# Patient Record
Sex: MALE | Race: WHITE
[De-identification: names, ages, dates, MRNs, and addresses within clinical notes are randomized per-mention and may not be internally consistent; named-entity substitution may affect disease eponyms.]

---

## 2019-03-11 ENCOUNTER — HOSPITAL ENCOUNTER (EMERGENCY)
Dept: HOSPITAL 10 - FTE | Age: 29
Discharge: HOME | End: 2019-03-11
Payer: COMMERCIAL

## 2019-03-11 ENCOUNTER — HOSPITAL ENCOUNTER (EMERGENCY)
Dept: HOSPITAL 91 - FTE | Age: 29
Discharge: HOME | End: 2019-03-11
Payer: COMMERCIAL

## 2019-03-11 VITALS
WEIGHT: 200.18 LBS | HEIGHT: 72 IN | BODY MASS INDEX: 27.11 KG/M2 | WEIGHT: 200.18 LBS | BODY MASS INDEX: 27.11 KG/M2 | HEIGHT: 72 IN

## 2019-03-11 VITALS — SYSTOLIC BLOOD PRESSURE: 109 MMHG | DIASTOLIC BLOOD PRESSURE: 58 MMHG | RESPIRATION RATE: 18 BRPM | HEART RATE: 74 BPM

## 2019-03-11 DIAGNOSIS — R11.2: ICD-10-CM

## 2019-03-11 DIAGNOSIS — R10.9: Primary | ICD-10-CM

## 2019-03-11 LAB
ADD MAN DIFF?: NO
ADD UMIC: YES
ALANINE AMINOTRANSFERASE: 82 IU/L (ref 13–69)
ALBUMIN/GLOBULIN RATIO: 1.58
ALBUMIN: 5.4 G/DL (ref 3.3–4.9)
ALKALINE PHOSPHATASE: 65 IU/L (ref 42–121)
ANION GAP: 16 (ref 5–13)
ASPARTATE AMINO TRANSFERASE: 51 IU/L (ref 15–46)
BASOPHIL #: 0 10^3/UL (ref 0–0.1)
BASOPHILS %: 0.2 % (ref 0–2)
BILIRUBIN,DIRECT: 0 MG/DL (ref 0–0.2)
BILIRUBIN,TOTAL: 0.9 MG/DL (ref 0.2–1.3)
BLOOD UREA NITROGEN: 19 MG/DL (ref 7–20)
CALCIUM: 10.9 MG/DL (ref 8.4–10.2)
CARBON DIOXIDE: 25 MMOL/L (ref 21–31)
CHLORIDE: 102 MMOL/L (ref 97–110)
CREATININE: 0.93 MG/DL (ref 0.61–1.24)
EOSINOPHILS #: 0 10^3/UL (ref 0–0.5)
EOSINOPHILS %: 0 % (ref 0–7)
GLOBULIN: 3.4 G/DL (ref 1.3–3.2)
GLUCOSE: 141 MG/DL (ref 70–220)
HEMATOCRIT: 49.4 % (ref 42–52)
HEMOGLOBIN: 16.7 G/DL (ref 14–18)
IMMATURE GRANS #M: 0.11 10^3/UL (ref 0–0.03)
IMMATURE GRANS % (M): 0.7 % (ref 0–0.43)
LIPASE: 44 U/L (ref 23–300)
LYMPHOCYTES #: 1.1 10^3/UL (ref 0.8–2.9)
LYMPHOCYTES %: 6.4 % (ref 15–51)
MEAN CORPUSCULAR HEMOGLOBIN: 29.7 PG (ref 29–33)
MEAN CORPUSCULAR HGB CONC: 33.8 G/DL (ref 32–37)
MEAN CORPUSCULAR VOLUME: 87.9 FL (ref 82–101)
MEAN PLATELET VOLUME: 9.9 FL (ref 7.4–10.4)
MONOCYTE #: 0.9 10^3/UL (ref 0.3–0.9)
MONOCYTES %: 5.5 % (ref 0–11)
NEUTROPHIL #: 14.4 10^3/UL (ref 1.6–7.5)
NEUTROPHILS %: 87.2 % (ref 39–77)
NUCLEATED RED BLOOD CELLS #: 0 10^3/UL (ref 0–0)
NUCLEATED RED BLOOD CELLS%: 0 /100WBC (ref 0–0)
PLATELET COUNT: 248 10^3/UL (ref 140–415)
POTASSIUM: 4.7 MMOL/L (ref 3.5–5.1)
RED BLOOD COUNT: 5.62 10^6/UL (ref 4.7–6.1)
RED CELL DISTRIBUTION WIDTH: 12.4 % (ref 11.5–14.5)
SODIUM: 143 MMOL/L (ref 135–144)
TOTAL PROTEIN: 8.8 G/DL (ref 6.1–8.1)
UR ASCORBIC ACID: NEGATIVE MG/DL
UR BILIRUBIN (DIP): NEGATIVE MG/DL
UR BLOOD (DIP): NEGATIVE MG/DL
UR CLARITY: CLEAR
UR COLOR: YELLOW
UR GLUCOSE (DIP): NEGATIVE MG/DL
UR KETONES (DIP): (no result) MG/DL
UR LEUKOCYTE ESTERASE (DIP): NEGATIVE LEU/UL
UR MUCUS: (no result) /HPF
UR NITRITE (DIP): NEGATIVE MG/DL
UR PH (DIP): 9 (ref 5–9)
UR RBC: 1 /HPF (ref 0–5)
UR SPECIFIC GRAVITY (DIP): 1.03 (ref 1–1.03)
UR TOTAL PROTEIN (DIP): (no result) MG/DL
UR UROBILINOGEN (DIP): NEGATIVE MG/DL
UR WBC: 1 /HPF (ref 0–5)
WHITE BLOOD COUNT: 16.5 10^3/UL (ref 4.8–10.8)

## 2019-03-11 PROCEDURE — 85025 COMPLETE CBC W/AUTO DIFF WBC: CPT

## 2019-03-11 PROCEDURE — 99285 EMERGENCY DEPT VISIT HI MDM: CPT

## 2019-03-11 PROCEDURE — 83690 ASSAY OF LIPASE: CPT

## 2019-03-11 PROCEDURE — 96374 THER/PROPH/DIAG INJ IV PUSH: CPT

## 2019-03-11 PROCEDURE — 36415 COLL VENOUS BLD VENIPUNCTURE: CPT

## 2019-03-11 PROCEDURE — 96375 TX/PRO/DX INJ NEW DRUG ADDON: CPT

## 2019-03-11 PROCEDURE — 74176 CT ABD & PELVIS W/O CONTRAST: CPT

## 2019-03-11 PROCEDURE — 80053 COMPREHEN METABOLIC PANEL: CPT

## 2019-03-11 PROCEDURE — 81001 URINALYSIS AUTO W/SCOPE: CPT

## 2019-03-11 RX ADMIN — ONDANSETRON HYDROCHLORIDE 1 MG: 2 INJECTION, SOLUTION INTRAMUSCULAR; INTRAVENOUS at 14:12

## 2019-03-11 RX ADMIN — THIAMINE HYDROCHLORIDE 1 MLS/HR: 100 INJECTION, SOLUTION INTRAMUSCULAR; INTRAVENOUS at 14:12

## 2019-03-11 RX ADMIN — HYDROMORPHONE HYDROCHLORIDE 1 MG: 1 INJECTION, SOLUTION INTRAMUSCULAR; INTRAVENOUS; SUBCUTANEOUS at 14:53

## 2019-03-11 NOTE — ERD
ER Documentation


Chief Complaint


Chief Complaint





ABDOMINAL PAIN WITH N/V SINCE 0600. PANCREATITIS





HPI


Is a 28-year-old male presents with moderate to severe abdominal pain that began


today with associated nausea and vomiting and also some diarrhea.  No fever.  


Pain is worse after eating.  No testicular pain or flank pain.  No dysuria 


hematuria or frequency.  Does have a history of pancreatitis many years ago.  


Denies heavy alcohol consumption.





ROS


All systems reviewed and are negative except as per history of present illness.





Medications


Home Meds


Active Scripts


Ondansetron (Ondansetron Odt) 4 Mg Tab.rapdis, 4 MG PO Q6H PRN for NAUSEA AND/OR


VOMITING, #20 TAB


   Prov:RYAN VALENTINO PA-C         3/11/19


Hydrocodone/Acetaminophen (Norco 5-325 Tablet) 1 Each Tablet, 1 TAB PO Q6H PRN 


for PAIN, #15 TAB


   Prov:RYAN VALENTINO PA-C         3/11/19


Dicyclomine HCl (Dicyclomine HCl) 10 Mg Capsule, 10 MG PO TID PRN for ABDOMINAL 


CRAMPING, #20 CAP


   Prov:RYAN VALENTINO PA-C         3/11/19





Allergies


Allergies:  


Coded Allergies:  


     No Known Allergy (Unverified , 3/11/19)





PMhx/Soc


Medical and Surgical Hx:  pt denies Medical Hx, pt denies Surgical Hx


Hx Alcohol Use:  No


Hx Substance Use:  No


Hx Tobacco Use:  No


Smoking Status:  Never smoker





FmHx


Family History:  No diabetes





Physical Exam


Vitals





Vital Signs


  Date      Temp  Pulse  Resp  B/P (MAP)   Pulse Ox  O2          O2 Flow    FiO2


Time                                                 Delivery    Rate


   3/11/19  97.9     59    20      133/93       100


     12:47                          (106)





Physical Exam


INITIAL VITAL SIGNS: Reviewed by me


GENERAL: Awake, alert and oriented x 4, well appearing, nontoxic, speaking in 


full sentences. No acute distress


HEAD: Atraumatic


NECK: Supple. No masses. Full range of motion.  No meningismus. No midline 


tenderness. 


EYES: EOMI. PERRL.





RESPIRATORY: Clear to auscultation bilaterally. Symmetric chest wall rise.  No 


wheezing or rales. No accessory muscle use.  


CV: Regular rate and rhythm. No murmurs, rubs, or gallops.  


ABDOMEN: Soft, non-distended.  Diffuse abdominal tenderness throughout. No CVA 


tenderness bilaterally. No guarding. No rebound. 


: Deffered.


Result Diagram:  


3/11/19 1411                                                                    


           3/11/19 1411





Results 24 hrs





Laboratory Tests


      Test
                                  3/11/19
13:54   3/11/19
14:11


      Urine Color                          YELLOW


      Urine Clarity                        CLEAR


      Urine pH                                        9.0


      Urine Specific Gravity                        1.030


      Urine Ketones                              2+ mg/dL


      Urine Nitrite                        NEGATIVE mg/dL


      Urine Bilirubin                      NEGATIVE mg/dL


      Urine Urobilinogen                   NEGATIVE mg/dL


      Urine Leukocyte Esterase
            NEGATIVE
Ajith/ul  



      Urine Microscopic RBC                        1 /HPF


      Urine Microscopic WBC                        1 /HPF


      Urine Mucus                          MODERATE /HPF


      Urine Hemoglobin                     NEGATIVE mg/dL


      Urine Glucose                        NEGATIVE mg/dL


      Urine Total Protein                        2+ mg/dl


      White Blood Count                                      16.5 10^3/ul


      Red Blood Count                                        5.62 10^6/ul


      Hemoglobin                                                16.7 g/dl


      Hematocrit                                                   49.4 %


      Mean Corpuscular Volume                                     87.9 fl


      Mean Corpuscular Hemoglobin                                 29.7 pg


      Mean Corpuscular Hemoglobin
Concent  
                   33.8 g/dl 



      Red Cell Distribution Width                                  12.4 %


      Platelet Count                                          248 10^3/UL


      Mean Platelet Volume                                         9.9 fl


      Immature Granulocytes %                                     0.700 %


      Neutrophils %                                                87.2 %


      Lymphocytes %                                                 6.4 %


      Monocytes %                                                   5.5 %


      Eosinophils %                                                 0.0 %


      Basophils %                                                   0.2 %


      Nucleated Red Blood Cells %                             0.0 /100WBC


      Immature Granulocytes #                               0.110 10^3/ul


      Neutrophils #                                          14.4 10^3/ul


      Lymphocytes #                                           1.1 10^3/ul


      Monocytes #                                             0.9 10^3/ul


      Eosinophils #                                           0.0 10^3/ul


      Basophils #                                             0.0 10^3/ul


      Nucleated Red Blood Cells #                             0.0 10^3/ul


      Sodium Level                                             143 mmol/L


      Potassium Level                                          4.7 mmol/L


      Chloride Level                                           102 mmol/L


      Carbon Dioxide Level                                      25 mmol/L


      Anion Gap                                                        16


      Blood Urea Nitrogen                                        19 mg/dl


      Creatinine                                               0.93 mg/dl


      Est Glomerular Filtrat Rate
mL/min   
                > 60 mL/min 



      Glucose Level                                             141 mg/dl


      Calcium Level                                            10.9 mg/dl


      Total Bilirubin                                           0.9 mg/dl


      Direct Bilirubin                                         0.00 mg/dl


      Indirect Bilirubin                                        0.9 mg/dl


      Aspartate Amino Transf
(AST/SGOT)    
                     51 IU/L 



      Alanine Aminotransferase
(ALT/SGPT)  
                     82 IU/L 



      Alkaline Phosphatase                                        65 IU/L


      Total Protein                                              8.8 g/dl


      Albumin                                                    5.4 g/dl


      Globulin                                                  3.40 g/dl


      Albumin/Globulin Ratio                                         1.58


      Lipase                                                       44 U/L





Current Medications


 Medications
   Dose
          Sig/Marylu
       Start Time
   Status  Last


 (Trade)       Ordered        Route
 PRN     Stop Time              Admin
Dose


                              Reason                                Admin


 Sodium         1,000 ml @ 
   Q1H STAT
      3/11/19       DC           3/11/19


Chloride       1,000 mls/hr   IV
            13:39
                       14:12



                                             3/11/19 14:38


 Morphine       4 mg           ONCE  STAT
    3/11/19       DC           3/11/19


Sulfate
                      IV
            13:39
                       14:11



(morphine)                                   3/11/19 13:40


 Ondansetron    4 mg           ONCE  STAT
    3/11/19       DC           3/11/19


HCl
  (Zofran                 IV
            13:39
                       14:12



Inj)                                         3/11/19 13:40


                1 mg           ONCE  STAT
    3/11/19       DC           3/11/19


Hydromorphone                 IV
            14:47
                       14:53



HCl
                                         3/11/19 14:48


(Dilaudid)








Procedures/MDM


The differential diagnosis includes but is not limited to appendicitis, 


cholelithiasis, cholecystitis, pancreatitis, hepatitis, gastritis, peptic ulcer 


disease, bowel obstruction, diverticulitis, renal disease including stones, 


torsion, AAA, pyelonephritis, and others.  Fluids Zofran and morphine given.  


Abdominal labs and CT ordered.  White blood cell count is elevated at 16.5.  


Otherwise his labs are unremarkable and CT scan is negative.  Patient counseled 


regarding my diagnostic impression and care plan. Prior to discharge all 


questions answered. Pt agrees with treatment plan and understands strict return 


precautions. Pt is instructed to follow up with primary care provider within 24-


48 hours. Precautionary instructions provided including instructions to return 


to the ER if not improving or for any worsening or changing symptoms or 


concerns.





Departure


Diagnosis:  


   Primary Impression:  


   Abdominal pain


Condition:  Stable











RYAN VALENTINO PA-C            Mar 11, 2019 13:45

## 2019-04-01 ENCOUNTER — HOSPITAL ENCOUNTER (EMERGENCY)
Dept: HOSPITAL 10 - FTE | Age: 29
Discharge: HOME | End: 2019-04-01
Payer: COMMERCIAL

## 2019-04-01 ENCOUNTER — HOSPITAL ENCOUNTER (EMERGENCY)
Dept: HOSPITAL 91 - FTE | Age: 29
Discharge: HOME | End: 2019-04-01
Payer: COMMERCIAL

## 2019-04-01 VITALS — DIASTOLIC BLOOD PRESSURE: 79 MMHG | SYSTOLIC BLOOD PRESSURE: 130 MMHG | HEART RATE: 82 BPM | RESPIRATION RATE: 16 BRPM

## 2019-04-01 VITALS
WEIGHT: 188.72 LBS | HEIGHT: 72 IN | WEIGHT: 188.72 LBS | BODY MASS INDEX: 25.56 KG/M2 | BODY MASS INDEX: 25.56 KG/M2 | HEIGHT: 72 IN

## 2019-04-01 DIAGNOSIS — R51: Primary | ICD-10-CM

## 2019-04-01 DIAGNOSIS — R53.83: ICD-10-CM

## 2019-04-01 PROCEDURE — 99282 EMERGENCY DEPT VISIT SF MDM: CPT

## 2019-04-01 NOTE — ERD
ER Documentation


Chief Complaint


Chief Complaint





feels pressure on back of head , dizziness , no neuro deficits





HPI


28-year-old male presents for multiple complaints.  He states that he has 


diffuse pressure type headache that  started this morning.  No treatment tried 


at home.  He also complains of weakness, anxiety, fatigue.  He was seen at 


urgent care a few days ago, bladder was done which was mostly normal.  Patient 


had a normal TSH level.  He was found to have a low testosterone level and was 


seen by urology a day ago was given a testosterone shot which he states helped 


but his symptoms returned.  It was noted that the urologist believes that the 


low testosterone level secondary to mother issue rather than a primary 


testosterone issue.  Patient has a history of anxiety and depression, having 


more stress recently, recent dog passed away.  He does have Ativan at home which


she states helps with the symptoms.





ROS


All systems reviewed and are negative except as per history of present illness.





Medications


Home Meds


Active Scripts


Ondansetron (Ondansetron Odt) 4 Mg Tab.rapdis, 4 MG PO Q6H PRN for NAUSEA AND/OR


VOMITING, #20 TAB


   Prov:RYAN VALENTINO PA-C         3/11/19


Hydrocodone/Acetaminophen (Norco 5-325 Tablet) 1 Each Tablet, 1 TAB PO Q6H PRN 


for PAIN, #15 TAB


   Prov:RYAN VLAENTINO PA-C         3/11/19


Dicyclomine HCl (Dicyclomine HCl) 10 Mg Capsule, 10 MG PO TID PRN for ABDOMINAL 


CRAMPING, #20 CAP


   Prov:RYAN VALENTINO PA-C         3/11/19





Allergies


Allergies:  


Coded Allergies:  


     No Known Allergy (Unverified , 3/11/19)





PMhx/Soc


Hx Alcohol Use:  No


Hx Substance Use:  No


Hx Tobacco Use:  No





Physical Exam


Vitals





Vital Signs


  Date      Temp  Pulse  Resp  B/P (MAP)   Pulse Ox  O2          O2 Flow    FiO2


Time                                                 Delivery    Rate


    4/1/19  97.9     82    16      130/79        99


     14:05                           (96)





Physical Exam


Const:   No acute distress


Head:   Atraumatic 


Eyes:    Normal Conjunctiva


ENT:    Normal External Ears, Nose and Mouth.


Neck:               Full range of motion. No meningismus.


Resp:   Clear to auscultation bilaterally


Cardio:   Regular rate and rhythm, no murmurs


Abd:    Soft, non tender, non distended. Normal bowel sounds


Skin:   No petechiae or rashes


Back:   No midline or flank tenderness


Ext:    No cyanosis, or edema


Neur:   Awake and alert


Psych:    Normal Mood and Affect


Results 24 hrs





Current Medications


 Medications
   Dose
          Sig/Marylu
       Start Time
   Status  Last


 (Trade)       Ordered        Route
 PRN     Stop Time              Admin
Dose


                              Reason                                Admin


 Ketorolac
     30 mg          ONCE  STAT
    4/1/19        DC       



Tromethamine
                 IM
            15:33
 4/1/19


 (Toradol)                                   15:35








Procedures/MDM


Medical Decision Making:





Differential diagnosis includes but not limited to primary headache, anxiety, 


depression, electrolyte disorder





Patient appeared well on physical exam.


Physical examination was unremarkable.


Patient did have pressured speech and appears anxious during examination.





Given that he had blood work done a couple days ago for similar symptoms and it 


was normal including normal thyroid function testing, it was decided that repeat


blood work was not necessary.


Patient states that he has a follow-up with endocrinology soon.





Advised the patient may have underlying anxiety, depression however medical 


causes need to be ruled out first.  He is advised to keep his appointment with 


his neurologist.  Otherwise if testing continues to be normal and he should 


focus on treatment for anxiety and depression.  Discussed with patient need for 


psychology and psychiatric services.





Prescription(s):


Patient states that he has Ativan at home so he will not need a prescription.





Offered to give patient some medication for headache however he refused stating 


that he has medication for headache at home.





Patient advised to follow up with PCP in 1-2 days. Patient advised to return to 


ED for new or worsening symptoms. Patient stable on discharge from the ED.








Disclaimer: Inadvertent spelling and grammatical errors are likely due to 


EHR/dictation software use and do not reflect on the overall quality of patient 


care. Also, please note that the electronic time recorded on this note does not 


necessarily reflect the actual time of the patient encounter.





Departure


Diagnosis:  


   Primary Impression:  


   Headache


   Headache type:  unspecified  Headache chronicity pattern:  unspecified 


   pattern  Intractability:  not intractable  Qualified Codes:  R51 - Headache


   Additional Impression:  


   Fatigue


   Fatigue type:  unspecified  Qualified Codes:  R53.83 - Other fatigue


Condition:  Fair


Patient Instructions:  Self-Care for Headaches, Understanding Generalized Anxi


ety Disorder (JOCELYN), Generalized Weakness


Referrals:  


COMM[facility]ITY CLINICS


YOU HAVE RECEIVED A MEDICAL SCREENING EXAM AND THE RESULTS INDICATE THAT YOU DO 


NOT HAVE A CONDITION THAT REQUIRES URGENT TREATMENT IN THE EMERGENCY DEPARTMENT.





FURTHER EVALUATION AND TREATMENT OF YOUR CONDITION CAN WAIT UNTIL YOU ARE SEEN 


IN YOUR DOCTORS OFFICE WITHIN THE NEXT 1-2 DAYS. IT IS YOUR RESPONSIBILITY TO 


MAKE AN APPOINTMENT FOR FOLOW-UP CARE.





IF YOU HAVE A PRIMARY DOCTOR


--you should call your primary doctor and schedule an appointment





IF YOU DO NOT HAVE A PRIMARY DOCTOR YOU CAN CALL OUR PHYSICIAN REFERRAL HOTLINE 


AT


 [phone] 





IF YOU CAN NOT AFFORD TO SEE A PHYSICIAN YOU CAN CHOSE FROM THE FOLLOWING 


Critical access hospital CLINICS





[facility] [phone] [address] [facility] [phone] [address] [facility]. [facility] [phone] [address] [facility] [phone] [address] [facility] [phone] [address] [facility]. [phone] [address]





Additional Instructions:  


Call your primary care doctor TOMORROW for an appointment during the next 1-2 


days.See the doctor sooner or return here if your condition worsens before your 


appointment time.











VICKI GUTHRIE DO                  Apr 1, 2019 15:50